# Patient Record
Sex: MALE | Race: WHITE | Employment: OTHER | URBAN - METROPOLITAN AREA
[De-identification: names, ages, dates, MRNs, and addresses within clinical notes are randomized per-mention and may not be internally consistent; named-entity substitution may affect disease eponyms.]

---

## 2018-06-08 ENCOUNTER — APPOINTMENT (OUTPATIENT)
Dept: GENERAL RADIOLOGY | Facility: HOSPITAL | Age: 73
End: 2018-06-08
Payer: MEDICARE

## 2018-06-08 ENCOUNTER — HOSPITAL ENCOUNTER (EMERGENCY)
Facility: HOSPITAL | Age: 73
Discharge: HOME | End: 2018-06-08
Payer: MEDICARE

## 2018-06-08 VITALS
RESPIRATION RATE: 16 BRPM | HEART RATE: 76 BPM | OXYGEN SATURATION: 99 % | SYSTOLIC BLOOD PRESSURE: 133 MMHG | BODY MASS INDEX: 28.63 KG/M2 | HEIGHT: 70 IN | DIASTOLIC BLOOD PRESSURE: 84 MMHG | TEMPERATURE: 98 F | WEIGHT: 200 LBS

## 2018-06-08 DIAGNOSIS — T14.8XXA HEMATOMA: ICD-10-CM
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
## 2018-06-08 DIAGNOSIS — S81.812A NONINFECTED SKIN TEAR OF LEFT LOWER EXTREMITY, INITIAL ENCOUNTER: ICD-10-CM

## 2018-06-08 DIAGNOSIS — S80.12XA CONTUSION OF LEFT LOWER EXTREMITY, INITIAL ENCOUNTER: ICD-10-CM

## 2018-06-08 DIAGNOSIS — S62.102A CLOSED FRACTURE OF LEFT WRIST, INITIAL ENCOUNTER: Primary | ICD-10-CM

## 2018-06-08 PROCEDURE — 73590 X-RAY EXAM OF LOWER LEG: CPT

## 2018-06-08 PROCEDURE — 99284 EMERGENCY DEPT VISIT MOD MDM: CPT

## 2018-06-08 PROCEDURE — 90471 IMMUNIZATION ADMIN: CPT

## 2018-06-08 PROCEDURE — 29105 APPLICATION LONG ARM SPLINT: CPT

## 2018-06-08 PROCEDURE — 73110 X-RAY EXAM OF WRIST: CPT

## 2018-06-08 NOTE — ED INITIAL ASSESSMENT (HPI)
Chito Richardson off a ladder while trimming bushes. Sustained left wrist deformity, pain to head, abrasion/hematoma to LLE. Denies LOC. States landed on [facility].

## 2018-06-09 NOTE — ED PROVIDER NOTES
Patient Seen in: [facility] Emergency Department    History   Patient presents with:  Fall (musculoskeletal, neurologic)    Stated Complaint: fall from ladder on left side.   Pt has pain and swelling in the left leg and de*    HPI    Patient is a ple BP: 149/90  Pulse: 86  Resp: 18  Temp: 98 °F (36.7 °C)  Temp src: Temporal  SpO2: 99 %  O2 Device: None (Room air)    Current:/90   Pulse 86   Temp 98 °F (36.7 °C) (Temporal)   Resp 18   Ht 177.8 cm (5' 10\")   Wt 90.7 kg   SpO2 99%   BMI 28.70 kg/m² PROCEDURE:  XR TIBIA + FIBULA (2 VIEWS), LEFT (CPT=73590)  TECHNIQUE:  AP and lateral views of the tibia and fibula were obtained. COMPARISON:  None. INDICATIONS:  fall from ladder on left side.   Pt has pain and swelling in the left leg and deformity to PROCEDURE:  XR WRIST COMPLETE (MIN 3 VIEWS), LEFT (CPT=73110)  TECHNIQUE:  Three views were obtained. COMPARISON:  None. INDICATIONS:  fall from ladder on left side.   Pt has pain and swelling in the left leg and deformity to the left wrist.  Also struck Patient was encouraged to return for reevaluation of any problems, worsening symptoms, or as discussed. Patient and family verbalized understanding and are comfortable with the plan as recommended.       Disposition and Plan     Clinical Impression:  Bernice

## (undated) NOTE — ED AVS SNAPSHOT
Venkata Jackson   MRN: [id number]    Department:  [facility] Emergency Department   Date of Visit:  6/8/2018           Disclosure     Insurance plans vary and the physician(s) referred by the ER may not be covered by your plan.  Please contact your i tell this physician (or your personal doctor if your instructions are to return to your personal doctor) about any new or lasting problems. The primary care or specialist physician will see patients referred from the [facility] Emergency Department.  [place]